# Patient Record
Sex: FEMALE | Race: WHITE | NOT HISPANIC OR LATINO | URBAN - METROPOLITAN AREA
[De-identification: names, ages, dates, MRNs, and addresses within clinical notes are randomized per-mention and may not be internally consistent; named-entity substitution may affect disease eponyms.]

---

## 2017-01-11 ENCOUNTER — APPOINTMENT (RX ONLY)
Dept: URBAN - METROPOLITAN AREA CLINIC 139 | Facility: CLINIC | Age: 22
Setting detail: DERMATOLOGY
End: 2017-01-11

## 2017-01-11 DIAGNOSIS — L70.0 ACNE VULGARIS: ICD-10-CM

## 2017-01-11 DIAGNOSIS — Z79.899 OTHER LONG TERM (CURRENT) DRUG THERAPY: ICD-10-CM

## 2017-01-11 PROCEDURE — ? PRESCRIPTION

## 2017-01-11 PROCEDURE — ? ORDER TESTS

## 2017-01-11 PROCEDURE — ? URINE PREGNANCY TEST

## 2017-01-11 PROCEDURE — 81025 URINE PREGNANCY TEST: CPT

## 2017-01-11 PROCEDURE — ? COUNSELING

## 2017-01-11 PROCEDURE — ? ISOTRETINOIN MONITORING

## 2017-01-11 PROCEDURE — 99213 OFFICE O/P EST LOW 20 MIN: CPT

## 2017-01-11 PROCEDURE — ? HIGH RISK MEDICATION MONITORING

## 2017-01-11 RX ORDER — ISOTRETINOIN 40 MG/1
CAPSULE ORAL
Qty: 60 | Refills: 0 | Status: ERX

## 2017-01-11 NOTE — PROCEDURE: ISOTRETINOIN MONITORING
Pounds Preamble Statement (Weight Entered In Details Tab): Reported Weight in pounds:
Dosing Month 2 (Required For Cumulative Dosing): 40mg Daily
Dosing Month 5 (Required For Cumulative Dosing): 40mg BID
Display Individual Monthly Dosage In The Note (If Yes Will Display All Dosages Which Are Not N/A): yes
Months Of Therapy Completed: 5
Ipledge Number (Optional): [id number]
Dosing Month 1 (Required For Cumulative Dosing): 20mg Daily
Weight Units: pounds
Detail Level: Zone

## 2017-02-13 ENCOUNTER — APPOINTMENT (RX ONLY)
Dept: URBAN - METROPOLITAN AREA CLINIC 139 | Facility: CLINIC | Age: 22
Setting detail: DERMATOLOGY
End: 2017-02-13

## 2017-02-13 DIAGNOSIS — Z79.899 OTHER LONG TERM (CURRENT) DRUG THERAPY: ICD-10-CM

## 2017-02-13 DIAGNOSIS — L70.0 ACNE VULGARIS: ICD-10-CM | Status: IMPROVED

## 2017-02-13 PROCEDURE — 81025 URINE PREGNANCY TEST: CPT

## 2017-02-13 PROCEDURE — ? TREATMENT REGIMEN

## 2017-02-13 PROCEDURE — ? HIGH RISK MEDICATION MONITORING

## 2017-02-13 PROCEDURE — ? COUNSELING

## 2017-02-13 PROCEDURE — 99213 OFFICE O/P EST LOW 20 MIN: CPT

## 2017-02-13 PROCEDURE — ? URINE PREGNANCY TEST

## 2017-02-13 PROCEDURE — ? ISOTRETINOIN MONITORING

## 2017-02-13 ASSESSMENT — LOCATION SIMPLE DESCRIPTION DERM
LOCATION SIMPLE: LEFT CHEEK
LOCATION SIMPLE: RIGHT CHEEK
LOCATION SIMPLE: RIGHT FOREHEAD

## 2017-02-13 ASSESSMENT — LOCATION DETAILED DESCRIPTION DERM
LOCATION DETAILED: RIGHT MEDIAL FOREHEAD
LOCATION DETAILED: RIGHT INFERIOR CENTRAL MALAR CHEEK
LOCATION DETAILED: LEFT INFERIOR CENTRAL MALAR CHEEK

## 2017-02-13 ASSESSMENT — LOCATION ZONE DERM: LOCATION ZONE: FACE

## 2017-02-13 NOTE — PROCEDURE: ISOTRETINOIN MONITORING
Kilograms Preamble Statement (Weight Entered In Details Tab): Reported Weight in pounds:
Months Of Therapy Completed: 7
Detail Level: Zone
Ipledge Number (Optional): [id number]
Dosing Month 7 (Required For Cumulative Dosing): 40mg BID
Display Individual Monthly Dosage In The Note (If Yes Will Display All Dosages Which Are Not N/A): no
Weight Units: pounds